# Patient Record
Sex: FEMALE | Race: WHITE
[De-identification: names, ages, dates, MRNs, and addresses within clinical notes are randomized per-mention and may not be internally consistent; named-entity substitution may affect disease eponyms.]

---

## 2017-03-04 ENCOUNTER — HOSPITAL ENCOUNTER (EMERGENCY)
Dept: HOSPITAL 25 - UCEAST | Age: 82
Discharge: HOME | End: 2017-03-04
Payer: MEDICARE

## 2017-03-04 VITALS — DIASTOLIC BLOOD PRESSURE: 64 MMHG | SYSTOLIC BLOOD PRESSURE: 121 MMHG

## 2017-03-04 DIAGNOSIS — Z96.651: ICD-10-CM

## 2017-03-04 DIAGNOSIS — I10: ICD-10-CM

## 2017-03-04 DIAGNOSIS — L98.8: Primary | ICD-10-CM

## 2017-03-04 DIAGNOSIS — Z87.891: ICD-10-CM

## 2017-03-04 PROCEDURE — G0463 HOSPITAL OUTPT CLINIC VISIT: HCPCS

## 2017-03-04 PROCEDURE — 99211 OFF/OP EST MAY X REQ PHY/QHP: CPT

## 2017-03-04 NOTE — UC
Skin Complaint HPI





- HPI Summary


HPI Summary: 





firm itchy peas size lump on right side of her neck for 4 months---can be itch 

and irritating at times---





- History of Current Complaint


Chief Complaint: UCSkin


Time Seen by Provider: 03/04/17 11:07


Stated Complaint: SOFT TISSUE COMPLAINT


Hx Obtained From: Patient


Hx Last Menstrual Period: MENAPAUSE


Pregnant?: No


Onset/Duration: Gradual Onset, Lasting Weeks - 12, Still Present


Skin Exposure Onset/Duration: Weeks Ago - 12


Timing: Constant


Onset Severity: Mild


Current Severity: Mild


Pain Intensity: 2


Pain Scale Used: 0-10 Numeric


Location: Discrete


Character: Redness, Raised


Aggravating: Nothing


Alleviating: Nothing


Associated Signs & Symptoms: Positive: Negative


Related History: Possible Reaction to: Insect - tick bite in same location 4 

months ago





- Allergy/Home Medications


Allergies/Adverse Reactions: 


 Allergies











Allergy/AdvReac Type Severity Reaction Status Date / Time


 


No Known Allergies Allergy   Verified 03/04/17 11:08














Review of Systems


Constitutional: Negative


Skin: Negative


Eyes: Negative


ENT: Negative


Respiratory: Negative


Cardiovascular: Negative


Gastrointestinal: Negative


Genitourinary: Negative


Motor: Negative


Neurovascular: Negative


Musculoskeletal: Negative


Neurological: Negative


Psychological: Negative


All Other Systems Reviewed And Are Negative: Yes





PMH/Surg Hx/FS Hx/Imm Hx


Previously Healthy: No


Endocrine History Of: 


   Denies: Diabetes, Thyroid Disease


Cardiovascular History Of: Reports: Cardiac Disorders - Unsure of what it is; 

something is going on, Hypertension


Respiratory History Of: 


   Denies: COPD, Asthma


GI/ History Of: 


   Denies: Ulcer


Cancer History Of: 


   Denies: Breast Cancer





- Surgical History


Surgical History: Yes


Surgery Procedure, Year, and Place: R KNEE REPLACEMENT ABOUT 6 YEARS AGO.  

APPENDECTOMY 7 YEARS AGO





- Family History


Known Family History: Positive: None


Family History: no known cardiovascular issues





- Social History


Occupation: Retired


Lives: With Family


Alcohol Use: Daily


Substance Use Type: None


Smoking Status (MU): Former Smoker


Amount Used/How Often: quit 20+ years ago


Have You Smoked in the Last Year: No





- Immunization History


Most Recent Influenza Vaccination: 2016


Most Recent Tetanus Shot: UTD PAST TEN YEARS


Most Recent Pneumonia Vaccination: UTD





Physical Exam


Triage Information Reviewed: Yes


Appearance: Well-Appearing, No Pain Distress, Well-Nourished


Vital Signs: 


 Initial Vital Signs











Temp  97.8 F   03/04/17 11:10


 


Pulse  65   03/04/17 11:10


 


Resp  18   03/04/17 11:10


 


BP  121/64   03/04/17 11:10


 


Pulse Ox  96   03/04/17 11:10











Eye Exam: Normal


Eyes: Positive: Conjunctiva Clear


ENT Exam: Normal


Dental Exam: Normal


Neck exam: Normal


Neck: Positive: Supple, Nontender, No Lymphadenopathy


Respiratory Exam: Normal


Respiratory: Positive: Chest non-tender, Lungs clear, Normal breath sounds, No 

respiratory distress, No accessory muscle use


Cardiovascular Exam: Normal


Cardiovascular: Positive: RRR, No Murmur, Pulses Normal, Brisk Capillary Refill


Musculoskeletal Exam: Normal


Musculoskeletal: Positive: Strength Intact, ROM Intact, No Edema


Neurological Exam: Normal


Neurological: Positive: Alert, Muscle Tone Normal


Psychological Exam: Normal


Skin Exam: Normal





Course/Dx





- Course


Course Of Treatment: hydrocortisone for itch follow with pcp or dermatology to 

have spot biopsied if needed





- Differential Diagnoses - Skin Complaint


Differential Diagnoses: Local Allergic Reaction, Lymphangitis





- Diagnoses


Provider Diagnoses: Right neck skin lesion





Discharge





- Discharge Plan


Condition: Stable


Disposition: HOME


Patient Education Materials:  Hydrocortisone (On the skin), Tick Bite (ED)


Referrals: 


Yo Maurer MD [Primary Care Provider] - 


Jillian Jett [Medical Doctor] - 1 Week

## 2017-09-15 ENCOUNTER — HOSPITAL ENCOUNTER (EMERGENCY)
Dept: HOSPITAL 25 - UCEAST | Age: 82
Discharge: HOME | End: 2017-09-15
Payer: MEDICARE

## 2017-09-15 VITALS — DIASTOLIC BLOOD PRESSURE: 71 MMHG | SYSTOLIC BLOOD PRESSURE: 126 MMHG

## 2017-09-15 DIAGNOSIS — B35.1: ICD-10-CM

## 2017-09-15 DIAGNOSIS — I10: ICD-10-CM

## 2017-09-15 DIAGNOSIS — H61.23: Primary | ICD-10-CM

## 2017-09-15 DIAGNOSIS — Z87.891: ICD-10-CM

## 2017-09-15 PROCEDURE — 99213 OFFICE O/P EST LOW 20 MIN: CPT

## 2017-09-15 PROCEDURE — G0463 HOSPITAL OUTPT CLINIC VISIT: HCPCS

## 2017-09-15 NOTE — UC
Ear Complaint HPI





- HPI Summary


HPI Summary: 





86 y/o female presents to the urgent care c/o B/L ear plugged with wax and 

decrese hearing. Pt also states she has fungal infection in her RT ring finger 

nail. Pt states she has HX of ear wax build up and every year her PCP irrigates 

her ear. However this time she hasn't been able to get an sooner appt. Pt 

denies ear pain, fever, SOB, chest pain, N/V/D





- History of Current Complaint


Chief Complaint: UCEar


Stated Complaint: CLOGGED EARS


Time Seen by Provider: 09/15/17 12:19


Hx Obtained From: Patient


Hx Last Menstrual Period: MENAPAUSE


Onset/Duration: Gradual Onset, Lasting Weeks, Still Present


Severity Initially: Moderate


Severity Currently: Moderate


Pain Intensity: 0


Pain Scale Used: 0-10 Numeric


Aggravating Factors: Nothing


Alleviating Factors: Nothing





- Allergies/Home Medications


Allergies/Adverse Reactions: 


 Allergies











Allergy/AdvReac Type Severity Reaction Status Date / Time


 


No Known Allergies Allergy   Verified 03/04/17 11:08














PMH/Surg Hx/FS Hx/Imm Hx


Previously Healthy: Yes


Cardiovascular History: Hypertension





- Surgical History


Surgical History: Yes


Surgery Procedure, Year, and Place: R KNEE REPLACEMENT ABOUT 6 YEARS AGO.  

APPENDECTOMY 7 YEARS AGO





- Family History


Known Family History: Positive: Cardiac Disease





- Social History


Occupation: Retired


Lives: With Family


Alcohol Use: Daily


Substance Use Type: None


Smoking Status (MU): Former Smoker


Amount Used/How Often: quit 20+ years ago


Have You Smoked in the Last Year: No





- Immunization History


Most Recent Influenza Vaccination: 2016


Most Recent Tetanus Shot: UTD PAST TEN YEARS


Most Recent Pneumonia Vaccination: UTD





Review of Systems


Constitutional: Negative


Skin: Other - RT ring finger nail with fungal infection


Eyes: Negative


ENT: Other - B/l ear with wax with  decrease hearing


Respiratory: Negative


Cardiovascular: Negative


Gastrointestinal: Negative


Genitourinary: Negative


Motor: Negative


Neurovascular: Negative


Musculoskeletal: Negative


Neurological: Negative


Psychological: Negative


Is Patient Immunocompromised?: No


All Other Systems Reviewed And Are Negative: Yes





Physical Exam


Triage Information Reviewed: Yes


Appearance: Well-Appearing, No Pain Distress, Well-Nourished


Vital Signs: 


 Initial Vital Signs











Temp  98.0 F   09/15/17 11:16


 


Pulse  64   09/15/17 11:16


 


Resp  18   09/15/17 11:16


 


BP  126/71   09/15/17 11:16


 


Pulse Ox  98   09/15/17 11:16











Vital Signs Reviewed: Yes


Eye Exam: Normal


Eyes: Positive: Conjunctiva Clear - PERRLA, EOMI


ENT: Positive: Normal ENT inspection, Hearing grossly normal - decrease due to B

/L cerumen impaction, Pharynx normal, TMs normal - B/L external ezars impacted 

with cerumen.  Negative: Nasal congestion, Nasal drainage, Tonsillar swelling, 

Tonsillar exudate


Neck exam: Normal


Neck: Positive: Supple, Nontender, No Lymphadenopathy


Respiratory Exam: Normal


Respiratory: Positive: Chest non-tender, Lungs clear


Cardiovascular Exam: Normal


Cardiovascular: Positive: RRR, No Murmur, Pulses Normal, Brisk Capillary Refill


Abdominal Exam: Normal


Abdomen Description: Positive: Nontender, No Organomegaly, Soft.  Negative: CVA 

Tenderness (R), CVA Tenderness (L)


Bowel Sounds: Positive: Present


Musculoskeletal Exam: Normal


Musculoskeletal: Positive: Strength Intact, ROM Intact, No Edema


Neurological Exam: Normal


Psychological Exam: Normal


Skin Exam: Normal


Skin: Positive: Other - RT #2 phalanx nail yellowish in color with scaling





Ear Complaint Course/Dx





- Course


Course Of Treatment: 86 y/o female presents to the urgent care c/o B/L ear 

plugged with wax and decrese hearing. Pt also states she has fungal infection 

in her RT ring finger nail. Pt states she has HX of ear wax build up and every 

year her PCP irrigates her ear. However this time she hasn't been able to get 

an sooner appt. Pt denies ear pain, fever, SOB, chest pain, N/V/D. On 

examination Pt with B/L ear impacted with cerumen and RT #2 phalanx nail with 

onychomycosis. Bilateral ear irrigation performed by nurse with a 200ml syringe 

and warm water and hydrogen peroxide 1:10. Patient tolerated well procedure 

without any dizziness. LF exteranl ear completely clear of cerume.  RT external 

ear still with mild cerumen.  Pt . Rx carbamide otic drops to help remove the 

rest of cerumen. Rx Efinacozole topical for the onychomycosis. D/C given. Pt 

understood and agreed with plan of care. Pt left the clinic ambulating and 

feeling better.





- Differential Dx/Diagnosis


Differential Diagnosis/HQI/PQRI: Otitis Externa, Otitis Media, Perforated TM, 

Other - cerumen impaction


Provider Diagnoses: 1- B/L ears with impacted cerumen.  2-RT #2 phalanx nail 

with onychomycosis





Discharge





- Discharge Plan


Condition: Stable


Disposition: HOME


Prescriptions: 


Carbamide Peroxide 6.5% OTIC* [DEBROX 6.5% Otic*] 5 drop RIGHT EAR BID #1 bottle


Efinaconazole [Jublia] 10 % EX ONCE #1 sol


Patient Education Materials:  Cerumen Impaction (ED)


Referrals: 


Yo Maurer MD [Primary Care Provider] - If Needed


Additional Instructions: 


1-Please apply otic drops in yout Rt ear as directed to help remove the rest of 

the cerumen.


2-Please apply the Eficanozole topical on your fungal nail infection for the 

following 3 weeks


3-If not improvement of symptoms please f/u with PCP or a Dermatologist for 

further evaluation dn treatment

## 2018-02-21 ENCOUNTER — HOSPITAL ENCOUNTER (OUTPATIENT)
Dept: HOSPITAL 25 - OREAST | Age: 83
Discharge: HOME | End: 2018-02-21
Attending: SPECIALIST
Payer: MEDICARE

## 2018-02-21 VITALS — SYSTOLIC BLOOD PRESSURE: 121 MMHG | DIASTOLIC BLOOD PRESSURE: 58 MMHG

## 2018-02-21 DIAGNOSIS — E78.00: ICD-10-CM

## 2018-02-21 DIAGNOSIS — Z87.891: ICD-10-CM

## 2018-02-21 DIAGNOSIS — I10: ICD-10-CM

## 2018-02-21 DIAGNOSIS — H35.3122: ICD-10-CM

## 2018-02-21 DIAGNOSIS — H35.3212: ICD-10-CM

## 2018-02-21 DIAGNOSIS — I25.10: ICD-10-CM

## 2018-02-21 DIAGNOSIS — H25.811: Primary | ICD-10-CM

## 2018-02-21 PROCEDURE — V2632 POST CHMBR INTRAOCULAR LENS: HCPCS

## 2018-02-21 NOTE — OP
DATE OF OPERATION:  02/21/2018 - [facility]

 

YOB: 1932.

 

SURGEON:  Js Atkins M.D.

 

PREOPERATIVE DIAGNOSIS:  Cataract right eye.

 

POSTOPERATIVE DIAGNOSIS:  Cataract right eye.

 

OPERATIVE PROCEDURE:  Extracapsular cataract extraction with intraocular lens 
implant right eye.

 

DESCRIPTION OF PROCEDURE:  The patient was brought to the operating room after 
being given 1/2% Alcaine with epinephrine drops in the preoperative area.  The 
eye was prepped and draped in the usual sterile fashion.  Sterile drape and 
eyelid speculum were placed.  Again, topical 1/2% Alcaine with epinephrine was 
given.  A paracentesis incision was made at the 9 o'clock position with the 
No.75 blade.  Clear cornea incision 2.2 x 2.2-mm was created at the 12 o'clock 
position starting at the anterior limbus using the 2.2-mm keratome.  The 
anterior chamber was irrigated with 0.4 mL of 1% non-preservative intracameral 
lidocaine and filled with DisCoVisc.  A capsulorrhexis was completed using the 
cystotome and the Utrata forceps. Hydrodissection was performed with balanced 
salt solution.  The lens nucleus was removed with the Phacoemulsification 
handpiece without incident.  Cortex was removed with the irrigation-aspiration 
handpiece.  The capsular bag was re-inflated using DisCoVisc and an SN60WF 20.5 
implant was inserted with the shooter. The irrigation-aspiration handpiece was 
used to remove all residual DisCoVisc.  The eye was refilled with balanced salt 
solution and the wound checked and found to be watertight.  Topical Maxitrol 
drops were given.

 

 [id number]/[facility] #: [id number]

[facility] Center[facility]IB[facility]

## 2018-02-28 ENCOUNTER — HOSPITAL ENCOUNTER (OUTPATIENT)
Dept: HOSPITAL 25 - OREAST | Age: 83
Discharge: HOME | End: 2018-02-28
Attending: SPECIALIST
Payer: MEDICARE

## 2018-02-28 VITALS — SYSTOLIC BLOOD PRESSURE: 141 MMHG | DIASTOLIC BLOOD PRESSURE: 71 MMHG

## 2018-02-28 DIAGNOSIS — I10: ICD-10-CM

## 2018-02-28 DIAGNOSIS — H35.3212: ICD-10-CM

## 2018-02-28 DIAGNOSIS — H25.812: Primary | ICD-10-CM

## 2018-02-28 DIAGNOSIS — E78.00: ICD-10-CM

## 2018-02-28 DIAGNOSIS — I25.10: ICD-10-CM

## 2018-02-28 DIAGNOSIS — Z87.891: ICD-10-CM

## 2018-02-28 DIAGNOSIS — H35.3122: ICD-10-CM

## 2018-02-28 DIAGNOSIS — I35.0: ICD-10-CM

## 2018-02-28 DIAGNOSIS — I44.7: ICD-10-CM

## 2018-02-28 PROCEDURE — V2632 POST CHMBR INTRAOCULAR LENS: HCPCS

## 2018-02-28 NOTE — OP
DATE OF OPERATION:  02/28/2018.

 

YOB: 1932.

 

SURGEON:  Js Atkins M.D.

 

PREOPERATIVE DIAGNOSIS:  Cataract left eye.

 

POSTOPERATIVE DIAGNOSIS:  Cataract left eye.

 

OPERATIVE PROCEDURE:  Extracapsular cataract extraction with intraocular lens implant left eye.

 

PROCEDURE:  The patient was brought to the operating room after being given 1/2% Alcaine with epineph
rine drops in the preoperative area.  The eye was prepped and draped in the usual sterile fashion.  S
terile drape and eyelid speculum were placed.  Again, topical 1/2% Alcaine with epinephrine was given
.  A paracentesis incision was made at the 3 o'clock position with the No.75 blade.  Clear cornea inc
ision 2.2 x 2.2-mm was created at the 6 o'clock position starting at the anterior limbus using the 2.
2-mm keratome.  The anterior chamber was irrigated with 0.4 mL of 1% non-preservative intracameral li
docaine and filled with DisCoVisc.  A capsulorrhexis was completed using the cystotome and the Utrata
 forceps. Hydrodissection was performed with balanced salt solution.  The lens nucleus was removed wi
th the Phacoemulsification handpiece without incident.  Cortex was removed with the irrigation-aspira
tion handpiece.  The capsular bag was re-inflated using DisCoVisc and an [id number] 21 implant was insert
ed with the shooter.  The irrigation-aspiration handpiece was used to remove all residual DisCoVisc. 
 The eye was refilled with balanced salt solution and the wound checked and found to be watertight.  
Topical Maxitrol drops were given.

 

 [id number]/[facility] #: [id number]

## 2019-12-04 ENCOUNTER — HOSPITAL ENCOUNTER (EMERGENCY)
Dept: HOSPITAL 25 - UCEAST | Age: 84
Discharge: HOME | End: 2019-12-04
Payer: MEDICARE

## 2019-12-04 VITALS — SYSTOLIC BLOOD PRESSURE: 132 MMHG | DIASTOLIC BLOOD PRESSURE: 80 MMHG

## 2019-12-04 DIAGNOSIS — H61.23: Primary | ICD-10-CM

## 2019-12-04 DIAGNOSIS — Z87.891: ICD-10-CM

## 2019-12-04 DIAGNOSIS — Z96.651: ICD-10-CM

## 2019-12-04 DIAGNOSIS — E78.5: ICD-10-CM

## 2019-12-04 PROCEDURE — 99213 OFFICE O/P EST LOW 20 MIN: CPT

## 2019-12-04 PROCEDURE — G0463 HOSPITAL OUTPT CLINIC VISIT: HCPCS

## 2019-12-04 NOTE — UC
Ear Complaint HPI





- HPI Summary


HPI Summary: 





86 yo female presents with b/l ear fullness. She tells me that she recently 

underwent an audiology eval for hearing aids and was told she had some wax in 

her ears and to have it cleaned out before hearing aid fitting. She is here 

requesting this today. Denies fever, sinus symptoms, pain, drainage from ears. 





- History of Current Complaint


Chief Complaint: UCEar


Stated Complaint: PLUGGED EAR


Time Seen by Provider: 12/04/19 15:35


Hx Obtained From: Patient


Hx Last Menstrual Period: MENAPAUSE


Severity Currently: None


Pain Intensity: 0





- Allergies/Home Medications


Allergies/Adverse Reactions: 


 Allergies











Allergy/AdvReac Type Severity Reaction Status Date / Time


 


No Known Allergies Allergy   Verified 12/04/19 15:31














PMH/Surg Hx/FS Hx/Imm Hx


Endocrine History: Dyslipidemia





- Surgical History


Surgical History: Yes


Surgery Procedure, Year, and Place: R KNEE REPLACEMENT ABOUT 6 YEARS AGO.  

APPENDECTOMY 7 YEARS AGO.  right breast lumpectomy, 35 yrs ago





- Family History


Known Family History: Positive: Cardiac Disease





- Social History


Occupation: Retired


Lives: With Family


Alcohol Use: Daily


Alcohol Amount: 1 per day


Substance Use Type: None


Smoking Status (MU): Former Smoker


Amount Used/How Often: pack a day for for 25 yrs


Have You Smoked in the Last Year: No


When Did the Patient Quit Smoking/Using Tobacco: 20 yrs ago





- Immunization History


Most Recent Influenza Vaccination: 2016


Most Recent Tetanus Shot: UTD PAST TEN YEARS


Most Recent Pneumonia Vaccination: UTD





Review of Systems


All Other Systems Reviewed And Are Negative: No


Constitutional: Positive: Negative


Skin: Positive: Negative


Eyes: Positive: Negative


ENT: Positive: Other - ear fullness


Respiratory: Positive: Negative


Cardiovascular: Positive: Negative


Neurovascular: Positive: Negative


Neurological: Positive: Negative


Psychological: Positive: Negative





Physical Exam





- Summary


Physical Exam Summary: 





GENERAL: NAD. WDWN. No pain distress.


SKIN: No rashes, sores, lesions, or open wounds.


HEENT:


            Head: AT/NC


            Eyes: EOM intact. Conjunctiva clear without inflammation or 

discharge.


            Ears: Hearing grossly normal. Mild cerumen impacted in b/l ear 

canals. S/p irrigation: TMs intact, no bulging, erythema, or edema. 


            Nose: Nasal mucosa pink and moist. NTTP maxillary and frontal 

sinus. 


            Throat: Posterior oropharynx without exudates, erythema, or 

tonsillar enlargement.  Uvula midline.


NECK: Supple. Nontender. No lymphadenopathy. 


NEURO: Alert. 


PSYCH: Age appropriate behavior.


Triage Information Reviewed: Yes


Vital Signs: 


 Initial Vital Signs











Temp  97.7 F   12/04/19 15:26


 


Pulse  68   12/04/19 15:26


 


Resp  16   12/04/19 15:26


 


BP  132/80   12/04/19 15:26


 


Pulse Ox  98   12/04/19 15:26











Vital Signs Reviewed: Yes





Ear Complaint Course/Dx





- Course


Course Of Treatment: 





Cerumen impaction. 


B/l ear irrigation by nursing with successful disimpaction. Pt tolerated well. 

TMs intact and WNL s/p. 





- Differential Dx/Diagnosis


Provider Diagnosis: 


 Cerumen impaction








Discharge ED





- Sign-Out/Discharge


Documenting (check all that apply): Patient Departure


All imaging exams completed and their final reports reviewed: No Studies





- Discharge Plan


Condition: Stable


Disposition: HOME


Patient Education Materials:  Cerumen Impaction (ED)


Referrals: 


Js Jefferson MD [Primary Care Provider] - 





- Billing Disposition and Condition


Condition: STABLE


Disposition: Home